# Patient Record
Sex: MALE | URBAN - METROPOLITAN AREA
[De-identification: names, ages, dates, MRNs, and addresses within clinical notes are randomized per-mention and may not be internally consistent; named-entity substitution may affect disease eponyms.]

---

## 2020-12-01 ENCOUNTER — IMPORTED ENCOUNTER (OUTPATIENT)
Dept: URBAN - METROPOLITAN AREA CLINIC 9 | Facility: CLINIC | Age: 70
End: 2020-12-01

## 2021-01-15 ENCOUNTER — IMPORTED ENCOUNTER (OUTPATIENT)
Dept: URBAN - METROPOLITAN AREA CLINIC 9 | Facility: CLINIC | Age: 71
End: 2021-01-15

## 2021-01-21 ENCOUNTER — IMPORTED ENCOUNTER (OUTPATIENT)
Dept: URBAN - METROPOLITAN AREA CLINIC 9 | Facility: CLINIC | Age: 71
End: 2021-01-21

## 2021-02-05 ENCOUNTER — IMPORTED ENCOUNTER (OUTPATIENT)
Dept: URBAN - METROPOLITAN AREA CLINIC 9 | Facility: CLINIC | Age: 71
End: 2021-02-05

## 2021-03-05 ENCOUNTER — IMPORTED ENCOUNTER (OUTPATIENT)
Dept: URBAN - METROPOLITAN AREA CLINIC 9 | Facility: CLINIC | Age: 71
End: 2021-03-05

## 2021-10-16 ASSESSMENT — VISUAL ACUITY
OS_SC: 20/25 -2 SN
OD_SC: 20/30 -2 SN
OD_SC: 20/40 -2 SN
OD_SC: 20/30 + SN
OS_SC: 20/20 -2 SN
OD_SC: 20/40 SN
OD_SC: 20/40 SN
OS_SC: 20/20 SN
OS_SC: 20/20 -2 SN
OS_SC: 20/25 - SN

## 2021-10-16 ASSESSMENT — TONOMETRY
OS_IOP_MMHG: 17
OD_IOP_MMHG: 8
OS_IOP_MMHG: 16
OD_IOP_MMHG: 14
OS_IOP_MMHG: 13
OS_IOP_MMHG: 17
OD_IOP_MMHG: 14
OD_IOP_MMHG: 7
OD_IOP_MMHG: 13
OS_IOP_MMHG: 14

## 2021-10-16 ASSESSMENT — PACHYMETRY
OS_CT_UM: 538.0
OD_CT_UM: 523.0

## 2021-12-14 NOTE — PATIENT DISCUSSION
The types of intraocular lenses were reviewed with the patient along with a discussion of their various strengths and weaknesses. Appears to prefer an advanced IOL.

## 2022-05-05 NOTE — PATIENT DISCUSSION
The types of intraocular lenses were reviewed with the patient along with a discussion of their various strengths and weaknesses. Suturegard Body: The suture ends were repeatedly re-tightened and re-clamped to achieve the desired tissue expansion.

## 2022-05-11 NOTE — PATIENT DISCUSSION
Cataract surgery has been performed in the first eye and activities of daily living are still impaired. The patient would like to proceed with cataract surgery in the second eye as scheduled. The patient elects basic, goal of Argelia.
